# Patient Record
(demographics unavailable — no encounter records)

---

## 2025-05-30 NOTE — PHYSICAL EXAM
[General Appearance - Well Developed] : well developed [General Appearance - Well Nourished] : well nourished [Edema] : no peripheral edema [Abdomen Soft] : soft [Abdomen Tenderness] : non-tender [] : no hepato-splenomegaly [Abdomen Mass (___ Cm)] : no abdominal mass palpated [Abdomen Hernia] : no hernia was discovered [Costovertebral Angle Tenderness] : no ~M costovertebral angle tenderness [Urethral Meatus] : meatus normal [Penis Abnormality] : normal circumcised penis [Epididymis] : the epididymides were normal [Testes Tenderness] : no tenderness of the testes [Testes Mass (___cm)] : there were no testicular masses [de-identified] : left testicle higher than right; left smaller than right; no masses; vas x 2; Grade 3 varicocele left; ? right [Chaperone Present] : A chaperone was present in the examining room during all aspects of the physical examination [FreeTextEntry2] : Mahamed French MA

## 2025-05-30 NOTE — ASSESSMENT
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  This was noted several years ago.  He was seen by urologist who diagnosed him with a left varicocele.  More recently he began to complain of right testicular discomfort he was seen by Dr. 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  On examination today his left testicle is superior to the right.  He states that he has been aware of this in the past.  Normal testes by palpation bilaterally without masses.  He has a grade 2 varicocele on the left  The findings of a varicocele were discussed with the patient.  Discussed indications for varicocelectomy: 1, fertility, 2. ? development of hypogonadism 3. Cosmesis 4. Pain  The findings of a varicocele were discussed with  BLESSING VILLARREAL. The pathophysiology was discussed. The surgical procedure of microsurgical varicocelectomy was fully discussed. Risks and complications were reviewed including but not limited to: 1. recurrence, 2. chronic testicular pain/discomfort, 3. hydrocele (temporary or permanent), 4. testicular injury or atrophy, 5 vas or nerve injury, 6. infection, 7. bleeding ,  8. general surgical and anesthetic risks etc. We discussed alternatives including veno-occlusive techniques  We reviewed ultrasound fingins left varicocele > 4.2 mm with reflux no varicocele right left epididymis small cyst  We discussed conservative management  of "orchalgia" with: a.  nonsteroidal anti-inflammatories, b. ice prn and c. scrotal support.  We discussed pelvic floor dysfunction as a cause and behavioural modifications (pants; sitting etc)  At the end of the review, the patient asked if his mother can accompany him We discussed at length indicaitons for varicocelectomy We discussed unpredictable affect on fertility We discussed varicocelectomy for pain and fact that his complaints are mild inconsistent and bilateral and therefore would be reluctant to entertain surgery for mild discomfort We discussed endocrine profile and followup upon return from [facility] The BLESSING STUART  and his mother expressed fully understanding of the information provided, the consequences and the management.  5.30.2025 patient returns  seen by urologist in [place] Dr Noel indicated bilateral varicocele recommended embolization occasional discomfort discussed embolization vs surgery risk and complication of embolization includng failue; phlebitis ;  migration of particles etc discussed referral to Dr Sanchez The findings of a varicocele were discussed with the patient.  Discussed indications for varicocelectomy: 1, fertility, 2. ? development of hypogonadism 3. Cosmesis 4. Pain Discussed semen analysis  Not able to produce secondary [employer] belief Discussed discomfort may or not is not likely related to varicocele The findings of a varicocele were discussed with  BLESSING STUART. He has a grade 3 varicocele on left ? right in exam The surgical procedure of microsurgical varicocelectomy was fully discussed. Risks and complications were reviewed including but not limited to: 1. recurrence, 2. chronic testicular pain/discomfort, 3. hydrocele (temporary or permanent), 4. testicular injury or atrophy, 5 vas or nerve injury, 6. infection, 7. bleeding ,  8. general surgical and anesthetic risks etc. Patient concerned re location of left testicle Discussed orchiopexy which I would not recommend in light  of  good position of left testicle and "relligious" concerns re violating testicle h Postoperative course was described in detail. All questions of the patient were answered.  Referral Dr Stratton re embolization repeat ultrasound

## 2025-05-30 NOTE — HISTORY OF PRESENT ILLNESS
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 varicocele on the right has not had recent ultrasound variable symptoms greater sensitivity to touch standing makes worse at times plays basketball without symptoms inconsistent and variable

## 2025-07-03 NOTE — HISTORY OF PRESENT ILLNESS
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 varicocele on the right has not had recent ultrasound variable symptoms greater sensitivity to touch standing makes worse at times plays basketball without symptoms inconsistent and variable  7.3.2025 patient returns  patient  has decided not to pursue venography

## 2025-07-03 NOTE — ASSESSMENT
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  This was noted several years ago.  He was seen by urologist who diagnosed him with a left varicocele.  More recently he began to complain of right testicular discomfort he was seen by Dr. 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  On examination today his left testicle is superior to the right.  He states that he has been aware of this in the past.  Normal testes by palpation bilaterally without masses.  He has a grade 2 varicocele on the left  The findings of a varicocele were discussed with the patient.  Discussed indications for varicocelectomy: 1, fertility, 2. ? development of hypogonadism 3. Cosmesis 4. Pain  The findings of a varicocele were discussed with  BLESSING VILLARREAL. The pathophysiology was discussed. The surgical procedure of microsurgical varicocelectomy was fully discussed. Risks and complications were reviewed including but not limited to: 1. recurrence, 2. chronic testicular pain/discomfort, 3. hydrocele (temporary or permanent), 4. testicular injury or atrophy, 5 vas or nerve injury, 6. infection, 7. bleeding ,  8. general surgical and anesthetic risks etc. We discussed alternatives including veno-occlusive techniques  We reviewed ultrasound fingins left varicocele > 4.2 mm with reflux no varicocele right left epididymis small cyst  We discussed conservative management  of "orchalgia" with: a.  nonsteroidal anti-inflammatories, b. ice prn and c. scrotal support.  We discussed pelvic floor dysfunction as a cause and behavioural modifications (pants; sitting etc)  At the end of the review, the patient asked if his mother can accompany him We discussed at length indicaitons for varicocelectomy We discussed unpredictable affect on fertility We discussed varicocelectomy for pain and fact that his complaints are mild inconsistent and bilateral and therefore would be reluctant to entertain surgery for mild discomfort We discussed endocrine profile and followup upon return from [facility] The BLESSING STUART  and his mother expressed fully understanding of the information provided, the consequences and the management.  5.30.2025 patient returns  seen by urologist in [place] Dr Noel indicated bilateral varicocele recommended embolization occasional discomfort discussed embolization vs surgery risk and complication of embolization includng failue; phlebitis ;  migration of particles etc discussed referral to Dr Sanchez The findings of a varicocele were discussed with the patient.  Discussed indications for varicocelectomy: 1, fertility, 2. ? development of hypogonadism 3. Cosmesis 4. Pain Discussed semen analysis  Not able to produce secondary [employer]ism belief Discussed discomfort may or not is not likely related to varicocele The findings of a varicocele were discussed with  BLESSING STUART. He has a grade 3 varicocele on left ? right in exam The surgical procedure of microsurgical varicocelectomy was fully discussed. Risks and complications were reviewed including but not limited to: 1. recurrence, 2. chronic testicular pain/discomfort, 3. hydrocele (temporary or permanent), 4. testicular injury or atrophy, 5 vas or nerve injury, 6. infection, 7. bleeding ,  8. general surgical and anesthetic risks etc. Patient concerned re location of left testicle Discussed orchiopexy which I would not recommend in light  of  good position of left testicle and "relligious" concerns re violating testicle h Postoperative course was described in detail. All questions of the patient were answered.  Referral Dr Stratton re embolization repeat ultrasound    7.3.2025 returns decided not to pursue venography The findings of a varicocele were discussed with the patient.  Discussed indications for varicocelectomy: 1, fertility, 2. ? developement of hypogonadism 3. Cosmesis 4. Pain recommend semen analysis if permitted [place] discussed pain  and unpredictable response  Repeat ultrasound demonstrates isolated left varicocele.  There is no varicocele on the right side.  These findings were discussed with the patient.  We discussed the fact that if he had bilateral varicoceles surgery to address concerns regarding fertility may be reasonable.  However in light of the fact that the predominant reason for varicocelectomy is symptoms and the finding on the right side is equivocal with ultrasound today demonstrating no varicocele I would recommend proceeding only with a left varicocelectomy.  Both he and his mother understand this fully..  He is unable to submit a semen analysis for Caodaism reasons.  Therefore we do not have the benefit of assessing his fertility potential at this time and are limited to treating his symptomatic left varicocele.  The findings of a varicocele were discussed with  BLESSING STUART. The pathophysiology was discussed. The surgical procedure of microsurgical varicocelectomy was fully discussed. Risks and complications were reviewed including but not limited to: 1. recurrence, 2. chronic testicular pain/discomfort, 3. hydrocele (temporary or permanent), 4. testicular injury or atrophy, 5 vas or nerve injury, 6. infection, 7. bleeding ,  8. general surgical and anesthetic risks etc. We discussed alternatives including veno-occlusive techniques  Postoperative course was described in detail. All questions of the patient were answered.    Plan proceed with left microsurgical varicocelectomy.  This will be coordinated to occur prior to scheduled ENT surgery for deviated septum   i de

## 2025-07-28 NOTE — ASSESSMENT
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  This was noted several years ago.  He was seen by urologist who diagnosed him with a left varicocele.  More recently he began to complain of right testicular discomfort he was seen by Dr. 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  On examination today his left testicle is superior to the right.  He states that he has been aware of this in the past.  Normal testes by palpation bilaterally without masses.  He has a grade 2 varicocele on the left  The findings of a varicocele were discussed with the patient.  Discussed indications for varicocelectomy: 1, fertility, 2. ? development of hypogonadism 3. Cosmesis 4. Pain  The findings of a varicocele were discussed with  BLESSING VILLARREAL. The pathophysiology was discussed. The surgical procedure of microsurgical varicocelectomy was fully discussed. Risks and complications were reviewed including but not limited to: 1. recurrence, 2. chronic testicular pain/discomfort, 3. hydrocele (temporary or permanent), 4. testicular injury or atrophy, 5 vas or nerve injury, 6. infection, 7. bleeding ,  8. general surgical and anesthetic risks etc. We discussed alternatives including veno-occlusive techniques  We reviewed ultrasound fingins left varicocele > 4.2 mm with reflux no varicocele right left epididymis small cyst  We discussed conservative management  of "orchalgia" with: a.  nonsteroidal anti-inflammatories, b. ice prn and c. scrotal support.  We discussed pelvic floor dysfunction as a cause and behavioural modifications (pants; sitting etc)  At the end of the review, the patient asked if his mother can accompany him We discussed at length indicaitons for varicocelectomy We discussed unpredictable affect on fertility We discussed varicocelectomy for pain and fact that his complaints are mild inconsistent and bilateral and therefore would be reluctant to entertain surgery for mild discomfort We discussed endocrine profile and followup upon return from [facility] The BLESSING STUART  and his mother expressed fully understanding of the information provided, the consequences and the management.  5.30.2025 patient returns  seen by urologist in [place] Dr Noel indicated bilateral varicocele recommended embolization occasional discomfort discussed embolization vs surgery risk and complication of embolization includng failue; phlebitis ;  migration of particles etc discussed referral to Dr Sanchez The findings of a varicocele were discussed with the patient.  Discussed indications for varicocelectomy: 1, fertility, 2. ? development of hypogonadism 3. Cosmesis 4. Pain Discussed semen analysis  Not able to produce secondary [employer] belief Discussed discomfort may or not is not likely related to varicocele The findings of a varicocele were discussed with  BLESSING STUART. He has a grade 3 varicocele on left ? right in exam The surgical procedure of microsurgical varicocelectomy was fully discussed. Risks and complications were reviewed including but not limited to: 1. recurrence, 2. chronic testicular pain/discomfort, 3. hydrocele (temporary or permanent), 4. testicular injury or atrophy, 5 vas or nerve injury, 6. infection, 7. bleeding ,  8. general surgical and anesthetic risks etc. Patient concerned re location of left testicle Discussed orchiopexy which I would not recommend in light  of  good position of left testicle and "relligious" concerns re violating testicle h Postoperative course was described in detail. All questions of the patient were answered.  Referral Dr Stratton re embolization repeat ultrasound    7.3.2025 returns decided not to pursue venography The findings of a varicocele were discussed with the patient.  Discussed indications for varicocelectomy: 1, fertility, 2. ? developement of hypogonadism 3. Cosmesis 4. Pain recommend semen analysis if permitted [place] discussed pain  and unpredictable response  Repeat ultrasound demonstrates isolated left varicocele.  There is no varicocele on the right side.  These findings were discussed with the patient.  We discussed the fact that if he had bilateral varicoceles surgery to address concerns regarding fertility may be reasonable.  However in light of the fact that the predominant reason for varicocelectomy is symptoms and the finding on the right side is equivocal with ultrasound today demonstrating no varicocele I would recommend proceeding only with a left varicocelectomy.  Both he and his mother understand this fully..  He is unable to submit a semen analysis for Cong[employer] reasons.  Therefore we do not have the benefit of assessing his fertility potential at this time and are limited to treating his symptomatic left varicocele.  The findings of a varicocele were discussed with  BLESSING STUART. The pathophysiology was discussed. The surgical procedure of microsurgical varicocelectomy was fully discussed. Risks and complications were reviewed including but not limited to: 1. recurrence, 2. chronic testicular pain/discomfort, 3. hydrocele (temporary or permanent), 4. testicular injury or atrophy, 5 vas or nerve injury, 6. infection, 7. bleeding ,  8. general surgical and anesthetic risks etc. We discussed alternatives including veno-occlusive techniques  Postoperative course was described in detail. All questions of the patient were answered.    Plan proceed with left microsurgical varicocelectomy.  This will be coordinated to occur prior to scheduled ENT surgery for deviated septum  7.28.2025 concerned right  side reviewed postoperative course postoperative instructive structure Patient was insistent on proceeding with repeat ultrasound examination.  Ultrasound examination today confirms a left varicocele.  There is no varicocele on the right side.  Reexamination confirms there is no hernia.  We discussed the fact that "clicking" in the inguinal area may represent hip issues.  We reviewed postoperative care and management.  We discussed the fact that long plane trips should be avoided or if he is taking a plane he should get up every 20 minutes while in flight.  We discussed the increased risk of blood clots after surgery as well as with prolonged flying.  Plan: Proceed with left microsurgical varicocelectomy as planned.  Postoperative instructions to be given to patient.

## 2025-07-28 NOTE — PHYSICAL EXAM
[Chaperone Present] : A chaperone was present in the examining room during all aspects of the physical examination [de-identified] : no hernia ; left varicoeele [FreeTextEntry2] : Mahamed French MA

## 2025-07-28 NOTE — HISTORY OF PRESENT ILLNESS
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 varicocele on the right has not had recent ultrasound variable symptoms greater sensitivity to touch standing makes worse at times plays basketball without symptoms inconsistent and variable  7.3.2025 patient returns  patient  has decided not to pursue venography  7.28.2025 patient returns with concern re right testicular/inguinal discomfort concerned varicocele on right was no diagnosed concerned  aaron ray wants repeat ultrasound